# Patient Record
Sex: MALE | URBAN - METROPOLITAN AREA
[De-identification: names, ages, dates, MRNs, and addresses within clinical notes are randomized per-mention and may not be internally consistent; named-entity substitution may affect disease eponyms.]

---

## 2020-12-29 ENCOUNTER — APPOINTMENT (RX ONLY)
Dept: URBAN - METROPOLITAN AREA CLINIC 377 | Facility: CLINIC | Age: 35
Setting detail: DERMATOLOGY
End: 2020-12-29

## 2020-12-29 DIAGNOSIS — B36.0 PITYRIASIS VERSICOLOR: ICD-10-CM

## 2020-12-29 PROCEDURE — ? PRESCRIPTION

## 2020-12-29 PROCEDURE — ? COUNSELING

## 2020-12-29 PROCEDURE — ? TREATMENT REGIMEN

## 2020-12-29 PROCEDURE — ? REASON FOR TELEMEDICINE VISIT

## 2020-12-29 PROCEDURE — ? CONSENT FOR TELEMEDICINE VISIT OBTAINED

## 2020-12-29 PROCEDURE — 99202 OFFICE O/P NEW SF 15 MIN: CPT

## 2020-12-29 RX ORDER — KETOCONAZOLE 20 MG/G
CREAM TOPICAL
Qty: 1 | Refills: 5 | Status: ERX | COMMUNITY
Start: 2020-12-29

## 2020-12-29 RX ADMIN — KETOCONAZOLE: 20 CREAM TOPICAL at 00:00

## 2020-12-29 ASSESSMENT — LOCATION ZONE DERM
LOCATION ZONE: NECK
LOCATION ZONE: ARM

## 2020-12-29 ASSESSMENT — LOCATION SIMPLE DESCRIPTION DERM
LOCATION SIMPLE: LEFT ANTERIOR NECK
LOCATION SIMPLE: LEFT UPPER ARM

## 2020-12-29 ASSESSMENT — LOCATION DETAILED DESCRIPTION DERM
LOCATION DETAILED: LEFT SUPERIOR ANTERIOR NECK
LOCATION DETAILED: LEFT ANTERIOR PROXIMAL UPPER ARM

## 2020-12-29 NOTE — PROCEDURE: CONSENT FOR TELEMEDICINE VISIT OBTAINED
Consent Text: After discussion of the risks, benefits and limitations with respect to this Telehealth visit, including potential limitations in picture and\\nvideo quality, the patient has given verbal consent to proceed with this Telehealth visit. Interactive audio and video\\ntelecommunications were used to permit real-time communication between myself and the patient. This Telehealth visit was\\nperformed due to the national COVID-19 emergency and recommended social distancing

## 2020-12-29 NOTE — HPI: RASH
Progress Notes by Sury Thornton, Ph.D at 05/03/17 06:04 PM     Author:  Sury Thornton, Ph.D Service:  (none) Author Type:  Psychologist     Filed:  05/03/17 06:07 PM Encounter Date:  5/3/2017 Status:  Signed     :  Sury Thornton, Ph.D (Psychologist)            Progress Note    Red Rule Applied    Service Provided:  I met with Bj today for 60 minutes for supportive individual therapy.     Relevant History and Session Note:  Bj presented today reporting that he[KF1.1C] has been feeling a little stressed and slightly down about the things going on in his life.  He states that his motivation has been slightly lowered.[KF1.1M]     Progress relevant to last session:[KF1.1C] moderate decline[KF1.1M]    Interventions:   Therapist took CBT and Person-centered approach, engaged in empathic listening and promoting positive self-regard as it relates to patients presenting problem of grief and relational issues.  The writer had the patient process his thoughts and feelings and they were validated.[KF1.1C] We processed how he felt about his sister's death and potential suicide.  We talked about reaching out to close friends and expressing how he is feelings, also potentially asking for help.[KF1.1M]      Bj presented today as:   Behavior: cooperative  Eye Contact: appropriate   Speech: logical and coherent  Attention:  adequate   Gait, movement and Motor Coordination: Within normal limits  Alert and Oriented: Yes, to Person, Place, and Time  Mood/Affect: slightly sad and down  Thought Process: logical and coherent   Cognitive Performance:  Normal limits  Insight and Judgment: within normal limits for age   Self-Harm: Denied   Current Suicidal Ideation: Suicidal ideation, plan, or intent reported? Denied  Homicidal Ideation: Homicidal ideation, plan, or intent reported?  Denied     Diagnosis:[KF1.1C]  MDD, recurrent, mild  Anxiety unspecified[KF1.1M]      Treatment Plan:   Goals:  1. Process 
What Type Of Note Output Would You Prefer (Optional)?: Bullet Format
Is The Patient Presenting As Previously Scheduled?: Yes
through thoughts and feelings about new relationship and fears    Interventions:  1. Interpersonal therapy to process things  2. CBT to improve mood    Barriers: The patient has lost several loved ones    Recommendations/Plan:  1. I will continue to follow Bj regularly to provide training in development of better coping strategies in relation to his issues of relational issues.    2. Follow up in 2-3 week(s).  3. Bj will go to the nearest emergency room or call 9-1-1 if he ever reports feelings of suicidality or if they believe he may be a threat to self or others.  Bj will inform Dr. Thornton or other support if he feels unsafe.   4. He was advised to utilize the strategies talked about above.    Current outpatient prescriptions       Medication  Sig Dispense Refill   • prednisoLONE acetate (PRED FORTE) 1 % ophthalmic suspension        • omeprazole (PRILOSEC) 20 MG Cap Take 1 Cap by mouth daily.  90 Cap  3   • aspirin 81 MG tablet Take 1 Tab by mouth daily.  30 Tab  12       Thank you for the opportunity to participate in the care of Bj. Please feel free to contact me at [phone] should you have any questions about my recommendations.      Electronically Signed by:    Sury Thornton, PH.D ,[KF1.1C] 5[KF1.1M]/3/2017  Licensed Clinical Psychologist  [facility]  Phone: [phone][KF1.1C]            Revision History        User Key Date/Time User Provider Type Action    > KF1.1 05/03/17 06:07 PM Sury Thornton, Ph.D Psychologist Sign    C - Copied, M - Manual            
How Severe Is Your Rash?: mild
Is This A New Presentation, Or A Follow-Up?: Rash
Additional History: Believed it to be a sun burn, wife (nurse) told him that it was tinea versicolor

## 2020-12-29 NOTE — PROCEDURE: TREATMENT REGIMEN
Initiate Treatment: Ketoconazole cream\\nOTC selenium sulfide containing products (Defense body wash)
Detail Level: Zone